# Patient Record
Sex: FEMALE | Race: OTHER
[De-identification: names, ages, dates, MRNs, and addresses within clinical notes are randomized per-mention and may not be internally consistent; named-entity substitution may affect disease eponyms.]

---

## 2023-08-05 ENCOUNTER — NON-APPOINTMENT (OUTPATIENT)
Age: 28
End: 2023-08-05

## 2023-11-11 ENCOUNTER — NON-APPOINTMENT (OUTPATIENT)
Age: 28
End: 2023-11-11

## 2024-03-06 PROBLEM — Z00.00 ENCOUNTER FOR PREVENTIVE HEALTH EXAMINATION: Status: ACTIVE | Noted: 2024-03-06

## 2024-03-14 ENCOUNTER — OUTPATIENT (OUTPATIENT)
Dept: OUTPATIENT SERVICES | Facility: HOSPITAL | Age: 29
LOS: 1 days | End: 2024-03-14
Payer: MEDICAID

## 2024-03-14 ENCOUNTER — APPOINTMENT (OUTPATIENT)
Dept: OBGYN | Facility: CLINIC | Age: 29
End: 2024-03-14
Payer: MEDICAID

## 2024-03-14 ENCOUNTER — LABORATORY RESULT (OUTPATIENT)
Age: 29
End: 2024-03-14

## 2024-03-14 VITALS — DIASTOLIC BLOOD PRESSURE: 72 MMHG | WEIGHT: 139.6 LBS | SYSTOLIC BLOOD PRESSURE: 114 MMHG

## 2024-03-14 DIAGNOSIS — N91.2 AMENORRHEA, UNSPECIFIED: ICD-10-CM

## 2024-03-14 DIAGNOSIS — R30.0 DYSURIA: ICD-10-CM

## 2024-03-14 PROCEDURE — 88141 CYTOPATH C/V INTERPRET: CPT

## 2024-03-14 PROCEDURE — 99203 OFFICE O/P NEW LOW 30 MIN: CPT | Mod: 25,GC

## 2024-03-14 PROCEDURE — 76830 TRANSVAGINAL US NON-OB: CPT | Mod: 26,GC

## 2024-03-14 NOTE — PROCEDURE
[Transvaginal OB Sonogram] : Transvaginal OB Sonogram [Yolk Sac] : yolk sac present [Intrauterine Pregnancy] : intrauterine pregnancy [Fetal Heart] : fetal heart present [CRL: ___ (mm)] : CRL - [unfilled]Umm [Date: ___] : Date: [unfilled] [Current GA by Sonogram: ___ (wks)] : Current GA by Sonogram: [unfilled]Uwks [___ day(s)] : [unfilled] days

## 2024-03-14 NOTE — PHYSICAL EXAM
[Appropriately responsive] : appropriately responsive [Chaperone Present] : A chaperone was present in the examining room during all aspects of the physical examination [Alert] : alert [Examination Of The Breasts] : a normal appearance [No Acute Distress] : no acute distress [No Masses] : no breast masses were palpable [Labia Majora] : normal [Normal] : normal

## 2024-03-15 ENCOUNTER — LABORATORY RESULT (OUTPATIENT)
Age: 29
End: 2024-03-15

## 2024-03-15 LAB
BILIRUB UR QL STRIP: NORMAL
CLARITY UR: CLEAR
COLLECTION METHOD: NORMAL
GLUCOSE UR-MCNC: NORMAL
HCG UR QL: 0.2 EU/DL
HGB UR QL STRIP.AUTO: NORMAL
KETONES UR-MCNC: NORMAL
LEUKOCYTE ESTERASE UR QL STRIP: NORMAL
NITRITE UR QL STRIP: NORMAL
PH UR STRIP: 6.5
PROT UR STRIP-MCNC: NORMAL
SP GR UR STRIP: 1.01

## 2024-03-15 NOTE — HISTORY OF PRESENT ILLNESS
[FreeTextEntry1] : 29 y/o  at 8w1d by LMP  presenting for confirmation of pregnancy. Patient states that she took 10 days of birth control in February to postpone her period, but never had a period in February. This prompted her to take a home pregnancy test, which was found to be positive. Denies any vaginal bleeding. Endorsing nausea every morning with occasional vomiting. This is a desired pregnancy.   OB Hx: 2018 FT  uncomplicated per pt Gyn Hx: denies hx of fibroids, ovarian cysts, endometriosis, abnormal pap smears, or STIs; most recent pap a few months ago, pt reports wnl  PMH: denies PSH: denies Meds: Citrazine All: seasonal allergies Social Hx: denies alcohol, tobacco, or drug use; denies hx of anxiety/depression Fam Hx: denies fam hx of clotting disorders

## 2024-03-15 NOTE — PLAN
[FreeTextEntry1] : 29 y/o  at 8w1d by LMP  presenting for confirmation of pregnancy, found to have IUP with CRL measuring 8w0d and +FH.   #Prenatal Clinic - Pt to follow up w/ [facility] in 4 weeks with sono  - Pap smear and GC done today   Patient seen and examined w/ Dr. Jeancarlos Alvarez PGY1

## 2024-03-20 DIAGNOSIS — N76.0 ACUTE VAGINITIS: ICD-10-CM

## 2024-03-21 ENCOUNTER — LABORATORY RESULT (OUTPATIENT)
Age: 29
End: 2024-03-21

## 2024-03-21 DIAGNOSIS — Z00.00 ENCOUNTER FOR GENERAL ADULT MEDICAL EXAMINATION WITHOUT ABNORMAL FINDINGS: ICD-10-CM

## 2024-03-21 DIAGNOSIS — N91.2 AMENORRHEA, UNSPECIFIED: ICD-10-CM

## 2024-03-21 DIAGNOSIS — Z32.00 ENCOUNTER FOR PREGNANCY TEST, RESULT UNKNOWN: ICD-10-CM

## 2024-03-21 LAB — CYTOLOGY SPEC DOC CYTO: SIGNIFICANT CHANGE UP

## 2024-03-21 PROCEDURE — G0463: CPT

## 2024-03-21 PROCEDURE — 88175 CYTOPATH C/V AUTO FLUID REDO: CPT

## 2024-03-21 PROCEDURE — 76830 TRANSVAGINAL US NON-OB: CPT

## 2024-03-21 PROCEDURE — 81002 URINALYSIS NONAUTO W/O SCOPE: CPT

## 2024-03-21 PROCEDURE — 87624 HPV HI-RISK TYP POOLED RSLT: CPT

## 2024-04-03 ENCOUNTER — APPOINTMENT (OUTPATIENT)
Dept: OBGYN | Facility: CLINIC | Age: 29
End: 2024-04-03